# Patient Record
Sex: FEMALE | Race: WHITE | URBAN - METROPOLITAN AREA
[De-identification: names, ages, dates, MRNs, and addresses within clinical notes are randomized per-mention and may not be internally consistent; named-entity substitution may affect disease eponyms.]

---

## 2018-08-06 ENCOUNTER — OFFICE VISIT (OUTPATIENT)
Dept: URGENT CARE | Facility: URGENT CARE | Age: 33
End: 2018-08-06
Payer: COMMERCIAL

## 2018-08-06 VITALS
HEART RATE: 69 BPM | RESPIRATION RATE: 16 BRPM | HEIGHT: 63 IN | TEMPERATURE: 98 F | DIASTOLIC BLOOD PRESSURE: 80 MMHG | OXYGEN SATURATION: 97 % | WEIGHT: 205.9 LBS | SYSTOLIC BLOOD PRESSURE: 120 MMHG | BODY MASS INDEX: 36.48 KG/M2

## 2018-08-06 DIAGNOSIS — Z22.322 MRSA (METHICILLIN RESISTANT STAPH AUREUS) CULTURE POSITIVE: Primary | ICD-10-CM

## 2018-08-06 DIAGNOSIS — J34.89 NASAL SORE: ICD-10-CM

## 2018-08-06 DIAGNOSIS — J01.00 ACUTE MAXILLARY SINUSITIS, RECURRENCE NOT SPECIFIED: ICD-10-CM

## 2018-08-06 PROCEDURE — 99203 OFFICE O/P NEW LOW 30 MIN: CPT | Performed by: PHYSICIAN ASSISTANT

## 2018-08-06 RX ORDER — SULFAMETHOXAZOLE AND TRIMETHOPRIM 200; 40 MG/5ML; MG/5ML
SUSPENSION ORAL
Qty: 400 ML | Refills: 0 | Status: SHIPPED | OUTPATIENT
Start: 2018-08-06

## 2018-08-06 RX ORDER — MUPIROCIN 20 MG/G
OINTMENT TOPICAL 3 TIMES DAILY
Qty: 30 G | Refills: 0 | Status: SHIPPED | OUTPATIENT
Start: 2018-08-06 | End: 2018-08-13

## 2018-08-06 RX ORDER — LISINOPRIL/HYDROCHLOROTHIAZIDE 10-12.5 MG
1 TABLET ORAL DAILY
COMMUNITY

## 2018-08-06 NOTE — MR AVS SNAPSHOT
"              After Visit Summary   2018    Christina Kunz    MRN: [id number]           Patient Information     Date Of Birth          1985        Visit Information        Provider Department      2018 10:05 AM Yusuf Brody PA-C [facility]        Today's Diagnoses     MRSA (methicillin resistant staph aureus) culture positive    -  1    Nasal sore        Acute maxillary sinusitis, recurrence not specified           Follow-ups after your visit        Who to contact     If you have questions or need follow up information about today's clinic visit or your schedule please contact [facility] directly at [phone].  Normal or non-critical lab and imaging results will be communicated to you by Zipscenehart, letter or phone within 4 business days after the clinic has received the results. If you do not hear from us within 7 days, please contact the clinic through MyChart or phone. If you have a critical or abnormal lab result, we will notify you by phone as soon as possible.  Submit refill requests through Phantom or call your pharmacy and they will forward the refill request to us. Please allow 3 business days for your refill to be completed.          Additional Information About Your Visit        MyChart Information     Phantom lets you send messages to your doctor, view your test results, renew your prescriptions, schedule appointments and more. To sign up, go to [email or web address] . Click on \"Log in\" on the left side of the screen, which will take you to the Welcome page. Then click on \"Sign up Now\" on the right side of the page.     You will be asked to enter the access code listed below, as well as some personal information. Please follow the directions to create your username and password.     Your access code is: [email or web address]  Expires: 2018 10:46 AM     Your access code will  in 90 days. If you need help or a new code, please " "call your [facility] clinic or [phone].        Care EveryWhere ID     This is your Care EveryWhere ID. This could be used by other organizations to access your [facility] medical records  [id number]        Your Vitals Were     Pulse Temperature Respirations Height Pulse Oximetry BMI (Body Mass Index)    69 98  F (36.7  C) 16 5' 3\" (1.6 m) 97% 36.47 kg/m2       Blood Pressure from Last 3 Encounters:   08/06/18 120/80    Weight from Last 3 Encounters:   08/06/18 205 lb 14.4 oz (93.4 kg)              Today, you had the following     No orders found for display         Today's Medication Changes          These changes are accurate as of 8/6/18 10:46 AM.  If you have any questions, ask your nurse or doctor.               Start taking these medicines.        Dose/Directions    mupirocin 2 % ointment   Commonly known as:  BACTROBAN   Used for:  Nasal sore, Acute maxillary sinusitis, recurrence not specified, MRSA (methicillin resistant staph aureus) culture positive        Apply topically 3 times daily for 7 days   Quantity:  30 g   Refills:  0       sulfamethoxazole-trimethoprim suspension   Commonly known as:  BACTRIM/SEPTRA   Used for:  MRSA (methicillin resistant staph aureus) culture positive, Nasal sore        20 ml po bid for 10 days   Quantity:  400 mL   Refills:  0            Where to get your medicines      These medications were sent to [facility] [address] [address]     Phone:  [phone]     mupirocin 2 % ointment    sulfamethoxazole-trimethoprim suspension                Primary Care Provider Fax #    Physician No Ref-Primary [phone]       No address on file        Equal Access to Services     IBRAHIMA WAGONER : Kimmy Ball, wazulma roberts, qaybta kaalsancho benito. So [facility] [phone].    ATENCIÓN: Si habla español, tiene a armendariz disposición servicios gratuitos de " asistencia lingüística. Kenan al [phone].    We comply with applicable federal civil rights laws and [place] laws. We do not discriminate on the basis of race, color, national origin, age, disability, sex, sexual orientation, or gender identity.            Thank you!     Thank you for choosing [facility]  for your care. Our goal is always to provide you with excellent care. Hearing back from our patients is one way we can continue to improve our services. Please take a few minutes to complete the written survey that you may receive in the mail after your visit with us. Thank you!             Your Updated Medication List - Protect others around you: Learn how to safely use, store and throw away your medicines at www.disposemymeds.org.          This list is accurate as of 8/6/18 10:46 AM.  Always use your most recent med list.                   Brand Name Dispense Instructions for use Diagnosis    ALPRAZOLAM PO      Take 0.5 mg by mouth        FLUOXETINE HCL PO           lisinopril-hydrochlorothiazide 10-12.5 MG per tablet    PRINZIDE/ZESTORETIC     Take 1 tablet by mouth daily        mupirocin 2 % ointment    BACTROBAN    30 g    Apply topically 3 times daily for 7 days    Nasal sore, Acute maxillary sinusitis, recurrence not specified, MRSA (methicillin resistant staph aureus) culture positive       sulfamethoxazole-trimethoprim suspension    BACTRIM/SEPTRA    400 mL    20 ml po bid for 10 days    MRSA (methicillin resistant staph aureus) culture positive, Nasal sore

## 2018-08-06 NOTE — PROGRESS NOTES
"SUBJECTIVE:  Christina Kunz is a 32 year old female here with concerns about sinus infection.  She states onset of symptoms were 5 day(s) ago.  She has had maxillary, frontal pressure. Course of illness is worsening. Severity moderate  Current and Associated symptoms: nasal sores  Predisposing factors include none. Recent treatment has included: nothing    PMH  HTN    Social History   Substance Use Topics     Smoking status: Not on file     Smokeless tobacco: Not on file     Alcohol use Not on file       ROS:  CONSTITUTIONAL:NEGATIVE for fever, chills, change in weight  INTEGUMENTARY/SKIN: POSITIVE for sinus congestion and nasal sores  ENT/MOUTH: Positive for nasal sores and sinus congestion  RESP:NEGATIVE for significant cough or SOB  CV: NEGATIVE for chest pain, palpitations or peripheral edema  GI: NEGATIVE for nausea, abdominal pain, heartburn, or change in bowel habits  MUSCULOSKELETAL: NEGATIVE for significant arthralgias or myalgia  NEURO: NEGATIVE for weakness, dizziness or paresthesias    OBJECTIVE:  /80  Pulse 69  Temp 98  F (36.7  C)  Resp 16  Ht 5' 3\" (1.6 m)  Wt 205 lb 14.4 oz (93.4 kg)  SpO2 97%  BMI 36.47 kg/m2  Exam:GENERAL APPEARANCE: healthy, alert and no distress  EYES: EOMI,  PERRL, conjunctiva clear  HENT: TM's normal bilaterally, nasal turbinates erythematous, swollen, rhinorrhea with nasal sores and maxillary sinus tenderness   NECK: supple, nontender, no lymphadenopathy  RESP: lungs clear to auscultation - no rales, rhonchi or wheezes  CV: regular rates and rhythm, normal S1 S2, no murmur noted  NEURO: Normal strength and tone, sensory exam grossly normal,  normal speech and mentation  SKIN: positive for nasal sores right side    ASSESSMENT/PLAN:      ICD-10-CM    1. MRSA (methicillin resistant staph aureus) culture positive Z22.322 mupirocin (BACTROBAN) 2 % ointment     sulfamethoxazole-trimethoprim (BACTRIM/SEPTRA) suspension   2. Nasal sore J34.89 mupirocin (BACTROBAN) 2 % " ointment     sulfamethoxazole-trimethoprim (BACTRIM/SEPTRA) suspension   3. Acute maxillary sinusitis, recurrence not specified J01.00 mupirocin (BACTROBAN) 2 % ointment       Nasal ointment for nasal sores  Wash hands  Follow up with primary clinic if not improving